# Patient Record
Sex: FEMALE | Race: WHITE | NOT HISPANIC OR LATINO | URBAN - NONMETROPOLITAN AREA
[De-identification: names, ages, dates, MRNs, and addresses within clinical notes are randomized per-mention and may not be internally consistent; named-entity substitution may affect disease eponyms.]

---

## 2019-05-22 ENCOUNTER — IMPORTED ENCOUNTER (OUTPATIENT)
Dept: URBAN - NONMETROPOLITAN AREA CLINIC 1 | Facility: CLINIC | Age: 29
End: 2019-05-22

## 2019-05-22 PROBLEM — H10.45: Noted: 2019-05-22

## 2019-05-22 PROCEDURE — 99213 OFFICE O/P EST LOW 20 MIN: CPT

## 2019-05-22 NOTE — PATIENT DISCUSSION
*Allergic Conjunctivitis:.-Discussed findings of exam in detail with the patient. - Discussed the episodic nature of the condition and treatment options with the patient. - The use of artificial tears and cool compresses were discussed with patient. - The offending allergen should be avoided if possible. - Patient advised not to rub eyes. - Prescription drops recommended. START MAXITROL 1 GTT QID OS X [id number] TO [facility] 27

## 2019-06-05 ENCOUNTER — IMPORTED ENCOUNTER (OUTPATIENT)
Dept: URBAN - NONMETROPOLITAN AREA CLINIC 1 | Facility: CLINIC | Age: 29
End: 2019-06-05

## 2019-06-05 PROBLEM — B30.8: Noted: 2019-06-05

## 2019-06-05 NOTE — PATIENT DISCUSSION
*VIRAL CONJUNCTIVITIS:.-Discussed the acute nature and treatment options with the patient.-The patient was warned of the contagious nature of this infection.-The patient was warned to return to the office immediately if they experience loss of vision or increased pain.-The use of artificial tears and cool compresses were discussed with patient.-The patient was prescribed and instructed on the use of prescription drops. start pf q2h osstart viropit x 8 times daily os ADMIT

## 2019-06-07 ENCOUNTER — IMPORTED ENCOUNTER (OUTPATIENT)
Dept: URBAN - NONMETROPOLITAN AREA CLINIC 1 | Facility: CLINIC | Age: 29
End: 2019-06-07

## 2019-06-07 PROBLEM — H18.53: Noted: 2019-06-07

## 2019-06-07 PROCEDURE — 99212 OFFICE O/P EST SF 10 MIN: CPT

## 2019-06-07 NOTE — PATIENT DISCUSSION
Granular Corneal Dystrophy:-Discussed the nature and treatment options with the patient.-The patient was warned to return to the office immediately if they experience loss of vision or increased pain.-The use of artificial tears and cool compresses were discussed with patient. -D/C prednisolone q2h OS-D/C trifluridine q2h OS-Start Lotemax BID OS (sample given)RTC: tuesday for follow up with zhou

## 2022-04-09 ASSESSMENT — VISUAL ACUITY
OD_SC: 20/20
OS_SC: 20/20
OS_SC: 20/60+1
OS_SC: 20/30